# Patient Record
Sex: FEMALE | Race: BLACK OR AFRICAN AMERICAN | NOT HISPANIC OR LATINO | Employment: STUDENT | URBAN - METROPOLITAN AREA
[De-identification: names, ages, dates, MRNs, and addresses within clinical notes are randomized per-mention and may not be internally consistent; named-entity substitution may affect disease eponyms.]

---

## 2024-06-19 ENCOUNTER — HOSPITAL ENCOUNTER (EMERGENCY)
Facility: OTHER | Age: 24
Discharge: HOME | End: 2024-06-19
Attending: EMERGENCY MEDICINE
Payer: COMMERCIAL

## 2024-06-19 VITALS
HEIGHT: 67 IN | TEMPERATURE: 99 F | BODY MASS INDEX: 29.82 KG/M2 | OXYGEN SATURATION: 100 % | WEIGHT: 190 LBS | DIASTOLIC BLOOD PRESSURE: 73 MMHG | RESPIRATION RATE: 18 BRPM | SYSTOLIC BLOOD PRESSURE: 115 MMHG | HEART RATE: 72 BPM

## 2024-06-19 DIAGNOSIS — R55 SYNCOPE: Primary | ICD-10-CM

## 2024-06-19 DIAGNOSIS — E86.0 DEHYDRATION: ICD-10-CM

## 2024-06-19 LAB
ALBUMIN SERPL BCP-MCNC: 3.3 G/DL (ref 3.5–5.2)
ALP SERPL-CCNC: 41 U/L (ref 55–135)
ALT SERPL W/O P-5'-P-CCNC: 25 U/L (ref 10–44)
ANION GAP SERPL CALC-SCNC: 10 MMOL/L (ref 8–16)
AST SERPL-CCNC: 19 U/L (ref 10–40)
BASOPHILS # BLD AUTO: 0.04 K/UL (ref 0–0.2)
BASOPHILS NFR BLD: 0.8 % (ref 0–1.9)
BILIRUB SERPL-MCNC: 0.2 MG/DL (ref 0.1–1)
BILIRUB UR QL STRIP: NEGATIVE
BUN SERPL-MCNC: 13 MG/DL (ref 6–20)
CALCIUM SERPL-MCNC: 8.7 MG/DL (ref 8.7–10.5)
CHLORIDE SERPL-SCNC: 110 MMOL/L (ref 95–110)
CLARITY UR: CLEAR
CO2 SERPL-SCNC: 18 MMOL/L (ref 23–29)
COLOR UR: YELLOW
CREAT SERPL-MCNC: 0.8 MG/DL (ref 0.5–1.4)
DIFFERENTIAL METHOD BLD: ABNORMAL
EOSINOPHIL # BLD AUTO: 0.1 K/UL (ref 0–0.5)
EOSINOPHIL NFR BLD: 1 % (ref 0–8)
ERYTHROCYTE [DISTWIDTH] IN BLOOD BY AUTOMATED COUNT: 13.4 % (ref 11.5–14.5)
EST. GFR  (NO RACE VARIABLE): >60 ML/MIN/1.73 M^2
GLUCOSE SERPL-MCNC: 99 MG/DL (ref 70–110)
GLUCOSE UR QL STRIP: NEGATIVE
HCG INTACT+B SERPL-ACNC: <1.2 MIU/ML
HCT VFR BLD AUTO: 35 % (ref 37–48.5)
HCT VFR BLD CALC: 36 %PCV (ref 36–54)
HCV AB SERPL QL IA: NEGATIVE
HGB BLD-MCNC: 11.3 G/DL (ref 12–16)
HGB BLD-MCNC: 12 G/DL
HGB UR QL STRIP: NEGATIVE
HIV 1+2 AB+HIV1 P24 AG SERPL QL IA: NEGATIVE
IMM GRANULOCYTES # BLD AUTO: 0 K/UL (ref 0–0.04)
IMM GRANULOCYTES NFR BLD AUTO: 0 % (ref 0–0.5)
KETONES UR QL STRIP: NEGATIVE
LEUKOCYTE ESTERASE UR QL STRIP: NEGATIVE
LYMPHOCYTES # BLD AUTO: 2.5 K/UL (ref 1–4.8)
LYMPHOCYTES NFR BLD: 49 % (ref 18–48)
MAGNESIUM SERPL-MCNC: 1.6 MG/DL (ref 1.6–2.6)
MCH RBC QN AUTO: 25.8 PG (ref 27–31)
MCHC RBC AUTO-ENTMCNC: 32.3 G/DL (ref 32–36)
MCV RBC AUTO: 80 FL (ref 82–98)
MONOCYTES # BLD AUTO: 0.3 K/UL (ref 0.3–1)
MONOCYTES NFR BLD: 6.8 % (ref 4–15)
NEUTROPHILS # BLD AUTO: 2.1 K/UL (ref 1.8–7.7)
NEUTROPHILS NFR BLD: 42.4 % (ref 38–73)
NITRITE UR QL STRIP: NEGATIVE
NRBC BLD-RTO: 0 /100 WBC
PH UR STRIP: 6 [PH] (ref 5–8)
PLATELET # BLD AUTO: 305 K/UL (ref 150–450)
PMV BLD AUTO: 10 FL (ref 9.2–12.9)
POC IONIZED CALCIUM: 1.23 MMOL/L (ref 1.06–1.42)
POTASSIUM BLD-SCNC: 3.5 MMOL/L (ref 3.5–5.1)
POTASSIUM SERPL-SCNC: 3.6 MMOL/L (ref 3.5–5.1)
PROT SERPL-MCNC: 6.5 G/DL (ref 6–8.4)
PROT UR QL STRIP: NEGATIVE
RBC # BLD AUTO: 4.38 M/UL (ref 4–5.4)
SAMPLE: NORMAL
SODIUM BLD-SCNC: 140 MMOL/L (ref 136–145)
SODIUM SERPL-SCNC: 138 MMOL/L (ref 136–145)
SP GR UR STRIP: 1.01 (ref 1–1.03)
TROPONIN I SERPL DL<=0.01 NG/ML-MCNC: 0.01 NG/ML (ref 0–0.03)
TSH SERPL DL<=0.005 MIU/L-ACNC: 1.52 UIU/ML (ref 0.4–4)
URN SPEC COLLECT METH UR: NORMAL
UROBILINOGEN UR STRIP-ACNC: NEGATIVE EU/DL
WBC # BLD AUTO: 5.02 K/UL (ref 3.9–12.7)

## 2024-06-19 PROCEDURE — 96360 HYDRATION IV INFUSION INIT: CPT

## 2024-06-19 PROCEDURE — 99900035 HC TECH TIME PER 15 MIN (STAT)

## 2024-06-19 PROCEDURE — 81003 URINALYSIS AUTO W/O SCOPE: CPT | Performed by: EMERGENCY MEDICINE

## 2024-06-19 PROCEDURE — 84702 CHORIONIC GONADOTROPIN TEST: CPT | Performed by: EMERGENCY MEDICINE

## 2024-06-19 PROCEDURE — 85025 COMPLETE CBC W/AUTO DIFF WBC: CPT | Performed by: EMERGENCY MEDICINE

## 2024-06-19 PROCEDURE — 87389 HIV-1 AG W/HIV-1&-2 AB AG IA: CPT | Performed by: EMERGENCY MEDICINE

## 2024-06-19 PROCEDURE — 84443 ASSAY THYROID STIM HORMONE: CPT | Performed by: EMERGENCY MEDICINE

## 2024-06-19 PROCEDURE — 82330 ASSAY OF CALCIUM: CPT

## 2024-06-19 PROCEDURE — 86803 HEPATITIS C AB TEST: CPT | Performed by: EMERGENCY MEDICINE

## 2024-06-19 PROCEDURE — 93010 ELECTROCARDIOGRAM REPORT: CPT | Mod: ,,, | Performed by: INTERNAL MEDICINE

## 2024-06-19 PROCEDURE — 99285 EMERGENCY DEPT VISIT HI MDM: CPT | Mod: 25

## 2024-06-19 PROCEDURE — 84132 ASSAY OF SERUM POTASSIUM: CPT

## 2024-06-19 PROCEDURE — 85014 HEMATOCRIT: CPT

## 2024-06-19 PROCEDURE — 80053 COMPREHEN METABOLIC PANEL: CPT | Performed by: EMERGENCY MEDICINE

## 2024-06-19 PROCEDURE — 25000003 PHARM REV CODE 250: Performed by: EMERGENCY MEDICINE

## 2024-06-19 PROCEDURE — 84295 ASSAY OF SERUM SODIUM: CPT

## 2024-06-19 PROCEDURE — 96361 HYDRATE IV INFUSION ADD-ON: CPT

## 2024-06-19 PROCEDURE — 93005 ELECTROCARDIOGRAM TRACING: CPT

## 2024-06-19 PROCEDURE — 83735 ASSAY OF MAGNESIUM: CPT | Performed by: EMERGENCY MEDICINE

## 2024-06-19 PROCEDURE — 84484 ASSAY OF TROPONIN QUANT: CPT | Performed by: EMERGENCY MEDICINE

## 2024-06-19 RX ADMIN — SODIUM CHLORIDE 1000 ML: 9 INJECTION, SOLUTION INTRAVENOUS at 09:06

## 2024-06-20 LAB
OHS QRS DURATION: 86 MS
OHS QTC CALCULATION: 422 MS

## 2024-06-20 NOTE — ED TRIAGE NOTES
Sonia Trammell, a 23 y.o. female presents to the ED w/ complaint of syncope and collapse.     Triage note:  Chief Complaint   Patient presents with    Loss of Consciousness     +ETOH, dehydration. Orthostatic hypotension with EMS. Stood up in restaurant and pass out. + LOC. Unknown head strike. No trauma.      Review of patient's allergies indicates:  No Known Allergies  Past Medical History:   Diagnosis Date    Anxiety disorder, unspecified     Pre-diabetes

## 2024-06-20 NOTE — DISCHARGE INSTRUCTIONS
"Thank you for letting us take care of you today! It was nice meeting you, and I hope you feel better soon.     Call your primary care doctor to make the first available appointment.     Keep all your medical appointments.     Take your regular medication as prescribed. Contact your primary care provider before running out of medication, or for any problems obtaining them.    Do not drive or operate heavy machinery while taking opioid or muscle relaxing medications. Examples include norco, percocet, xanax, valium, flexeril.     Overuse or long term use of pain and sedating medication may lead to addiction, dependence, organ failure, family and work problems, legal problems, accidental overdose, and death.    If you do not have health insurance, you probably can afford it:  Call 1-190.546.5379 (Counts include 234 beds at the [facility] hotline) or go to www.[place].gov    Your evaluation in the ER does not suggest any emergent or life threatening medical condition requiring admission or immediate intervention beyond that provided in the ER.   However, the signs of a serious problem sometimes take more time to appear.     Do not hesitate to return to the ER if any of the following occur:    Weakness, dizziness, fainting, or loss of consciousness   Fever of 100.4ºF (38ºC) or higher  Any worse symptoms  Any new or concerning symptoms    Fainting (Syncope)  Fainting is a sudden, brief loss of consciousness. When people faint, or pass out, they usually fall down. After they are lying down, most people will recover quickly.    The term doctors use for fainting is syncope (say "[id number]").    Fainting one time is usually nothing to worry about. But it is a good idea to see your doctor, because fainting could have a serious cause.    What causes it?  Fainting is caused by a drop in blood flow to the brain. After you lose consciousness and fall or lie down, more blood can flow to your brain so you wake up again.    Most causes of fainting are usually not " signs of a more serious illness. In these cases, you faint because of:    The vasovagal reflex, which causes the heart rate to slow and the blood vessels to widen, or dilate. As a result, blood pools in the lower body and less blood goes to the brain. This reflex can be triggered by many things, including stress, pain, fear, coughing, holding your breath, and urinating.  Orthostatic hypotension, or a sudden drop in blood pressure when you change position. This can happen if you stand up too fast, get dehydrated, or take certain medicines, such as ones for high blood pressure.  Fainting caused by the vasovagal reflex is often easy to predict. It happens to some people every time they have to get a shot or they see blood. Some people know they are going to faint because they have symptoms beforehand, such as feeling weak, nauseated, hot, or dizzy. After they wake up, they may feel confused, dizzy, or ill for a while.    Some causes of fainting can be serious. These include:    Heart or blood vessel problems such as a blood clot in the lungs, an abnormal heartbeat, a heart valve problem, or heart disease.  Nervous system problems such as seizure, stroke, or TIA.  Sometimes the cause is unknown.      How can you care for yourself?    Drink plenty of fluids so you don't get dehydrated.  Stand up slowly.  See a doctor if you have ongoing dizziness    Prevent Heat-Related Illness  Heat-related illness occurs when the bodys temperature gets too high. Body temperature can be affected by the temperature of the air and by level of physical activity. To protect your child from heat-related illness, follow the tips on this sheet.  What are the symptoms of heat-related illness?  Heat-related illness can range in symptoms from mild (heat cramps), to moderate (heat exhaustion), to severe (heat stroke).  Mild: heat cramps  Sweating a lot  Having painful spasm in muscles during activity or hours later (heat cramps)  Developing tiny  red bumps on skin and a prickly sensation (heat rash or prickly heat)  Feeling irritable, dizzy, or weak  Moderate: heat exhaustion  Sweating a lot  Having cold, moist, pale, or flushed skin  Feeling very weak or tired  Having headache, nausea, loss of appetite  Having rapid or weak pulse  Having painful muscle cramps  Severe: heat strokeNOTE: If your child has symptoms of heat stroke, call 911 or take your child to the emergency department right away.  Not sweating  Having hot, dry skin that looks red, gray, or bluish  Having deep, fast breathing  Having headache or nausea  Having rapid, weak, or irregular pulse  Feeling dizzy, confused, or delirious  Fainting  Having convulsions or other shaking movements  How is heat-related illness treated?  For symptoms of heat exhaustion or heat stroke, call 911 or go to the nearest emergency department. You can also start treatment yourself by doing the following:   Remove person from the heat, direct sun, or warm air that is causing the illness.  Give cold fluids, such as water, to drink to prevent dehydration. Infants can be given a childrens electrolyte solution.  Apply cool compresses on forehead, neck, and underarms.  Blow cool air onto skin with fans.  Take a bath in cool water to bring down body temperature. Make sure the water is not too cold.    When to call the doctor  Call the doctor if any of the following:  Fever  In an infant under 3 months old, a rectal temperature of 100.4°F (38.0°C) or higher  In a child 3 to 36 mnths, a rectal temperature of 102°F (39.0°C) or higher  Person of any age who has a temperature of 103°F (39.4°C) or higher  A fever that lasts more than 24-hours in a child under 2 years old, or for 3 days in a child 2 years or older  Your child has had a seizure caused by the fever  Signs of dehydration (very dark or little urine, excessive thirst, dry mouth, dizziness)  Increased tiredness or lack of energy  A fainting spell   How is heat-related  illness prevented?  You can do the following to prevent getting heat-related illness:  Drink plenty of fluids  Dress in appropriate clothing for the weather.  Rest and take breaks during exercise or physical activity.

## 2024-06-20 NOTE — ED PROVIDER NOTES
"  Source of History:  Medical record, patient  Independent history obtained from : EMS, friend.     Chief complaint:  Per triage note: "Loss of Consciousness (+ETOH, dehydration. Orthostatic hypotension with EMS. Stood up in restaurant and pass out. + LOC. Unknown head strike. No trauma. )  "    HPI:    Patient presents for evaluation of syncopal episode.  Patient had a syncopal episode in a restaurant after having a few sips of vodka." She walked about a 1/2 mi to the restaurant.  Over the past several days, she has had mild headache, and abdominal cramping she attributed to her usual premenstrual syndrome.  She states otherwise she has been in her usual state of health. She denies any associated fevers, cough, dyspnea, sore throat, rhinorrhea.       ROS:   See HPI for pertinent review of systems    Review of patient's allergies indicates:  No Known Allergies    PMH:  As per HPI and below:  Past Medical History:   Diagnosis Date    Anxiety disorder, unspecified     Pre-diabetes        History reviewed. No pertinent surgical history.         Physical Exam:      Nursing note and vitals reviewed.  /73   Pulse 72   Temp 98.7 °F (37.1 °C) (Oral)   Resp 18   Ht 5' 7" (1.702 m)   Wt 86.2 kg (190 lb)   SpO2 100%   Breastfeeding No   BMI 29.76 kg/m²     Constitutional: No distress.  Eyes: EOMI. No discharge. Anicteric.  HENT:   Neck: Normal range of motion. Neck supple.  Cardiovascular: Normal rate. No murmur, no gallop and no friction rub heard.   Pulmonary/Chest: No respiratory distress. Effort normal. No wheezes, no rales, no rhonchi.   Abdominal: Bowel sounds normal. Soft. No distension and no mass. There is no tenderness. There is no rebound, no guarding, no tenderness at McBurney's point.  Neurological: GCS 15. Alert and oriented to person, place, and time. No gross cranial nerve, light touch or strength deficit. Coordination normal.   Skin: Skin is warm and dry.   EXT: 2+ radial pulses.         Medical " Decision Making / Independent Interpretations / External Records Reviewed:      Patient is a 23-year-old female on Wegovy / semaglutide who presents for evaluation of syncopal episode after walking about a half mile to a restaurant and having part of an alcoholic drink.  The initial differential included ectopic pregnancy, dehydration, gross metabolic abnormality, dysautonomia, acute viral syndrome     ED Course as of 06/20/24 0055   Wed Jun 19, 2024 2024 --  Prehospital/EMS EKG Interpretation: Normal sinus rhythm at 96 beats per minute, no STEMI, no significant acute ST/T abnormalities, normal intervals.    Prior tracing not immediately available.  --    --  EKG Interpretation: normal sinus rhythm at 82 beats per minute, no STEMI, no significant acute ST/T abnormalities, normal intervals.  No acute change compared to prior tracing.  --   [RC]   2155 I independently interpreted labs which are notable for metabolic acidosis otherwise unremarkable and unrevealing.  [RC]   Thu Jun 20, 2024 0054 After completing 2 total L of IV fluids, pt felt back at her baseline, is able to ambulate without any difficulty, or orthostatic symptoms.  She feels comfortable with discharge.  --  I discussed with pt and/or guardian/caretaker that this evaluation in the ED does not suggest any emergent or life threatening medical condition requiring admission or further immediate intervention or diagnostics. Regardless, an unremarkable evaluation in the ED does not preclude the development or presence of a serious or life threatening condition. Pt was instructed to return for any worsening, new, changed, or concerning symptoms.     I had a detailed discussion with patient and/or guardian/caretaker regarding findings, plan, return precautions, importance of medication adherence, need to follow-up with a PCP and specialist. All questions answered.  Patient's friend was at the bedside for this discussion.    Note was created using voice  recognition software. It may have occasional typographical errors not identified and edited despite initial review prior to signing.   [RC]      ED Course User Index  [RC] Catalino Kaur MD       --  I decided to obtain the patient's medical records. I reviewed patient's prior external notes / results: however none were immediately available.   --  Additional Medical Decision Making: Hospitalization or escalation of care considered    Medications   sodium chloride 0.9% bolus 1,000 mL 1,000 mL (0 mLs Intravenous Stopped 6/19/24 9339)              No future appointments.     Diagnostic Impression:    1. Syncope    2. Dehydration         ED Disposition Condition    Discharge Good          ED Prescriptions    None       Follow-up Information       Follow up With Specialties Details Why Contact Info    Your primary care doctor  In 1 week For recheck with your primary care doctor                Catalino Kaur MD  06/20/24 3298